# Patient Record
Sex: MALE | Race: BLACK OR AFRICAN AMERICAN | NOT HISPANIC OR LATINO | Employment: FULL TIME | URBAN - METROPOLITAN AREA
[De-identification: names, ages, dates, MRNs, and addresses within clinical notes are randomized per-mention and may not be internally consistent; named-entity substitution may affect disease eponyms.]

---

## 2019-11-01 ENCOUNTER — HOSPITAL ENCOUNTER (EMERGENCY)
Facility: CLINIC | Age: 34
Discharge: HOME | End: 2019-11-01
Attending: EMERGENCY MEDICINE | Admitting: EMERGENCY MEDICINE
Payer: COMMERCIAL

## 2019-11-01 VITALS
BODY MASS INDEX: 30.24 KG/M2 | WEIGHT: 228.18 LBS | RESPIRATION RATE: 16 BRPM | HEART RATE: 74 BPM | SYSTOLIC BLOOD PRESSURE: 121 MMHG | HEIGHT: 73 IN | OXYGEN SATURATION: 98 % | DIASTOLIC BLOOD PRESSURE: 79 MMHG | TEMPERATURE: 98.3 F

## 2019-11-01 DIAGNOSIS — Z11.3 SCREEN FOR STD (SEXUALLY TRANSMITTED DISEASE): ICD-10-CM

## 2019-11-01 LAB
ALBUMIN UR-MCNC: 10 MG/DL
APPEARANCE UR: CLEAR
BILIRUB UR QL STRIP: NEGATIVE
COLOR UR AUTO: YELLOW
GLUCOSE UR STRIP-MCNC: NEGATIVE MG/DL
HGB UR QL STRIP: NEGATIVE
KETONES UR STRIP-MCNC: 10 MG/DL
LEUKOCYTE ESTERASE UR QL STRIP: ABNORMAL
MUCOUS THREADS #/AREA URNS LPF: PRESENT /LPF
NITRATE UR QL: NEGATIVE
PH UR STRIP: 6 PH (ref 5–7)
RBC #/AREA URNS AUTO: 1 /HPF (ref 0–2)
SOURCE: ABNORMAL
SP GR UR STRIP: 1.02 (ref 1–1.03)
SQUAMOUS #/AREA URNS AUTO: 2 /HPF (ref 0–1)
UROBILINOGEN UR STRIP-MCNC: 2 MG/DL (ref 0–2)
WBC #/AREA URNS AUTO: 9 /HPF (ref 0–5)

## 2019-11-01 PROCEDURE — 25000128 H RX IP 250 OP 636: Performed by: EMERGENCY MEDICINE

## 2019-11-01 PROCEDURE — 87591 N.GONORRHOEAE DNA AMP PROB: CPT | Performed by: EMERGENCY MEDICINE

## 2019-11-01 PROCEDURE — 25000132 ZZH RX MED GY IP 250 OP 250 PS 637: Performed by: EMERGENCY MEDICINE

## 2019-11-01 PROCEDURE — 81001 URINALYSIS AUTO W/SCOPE: CPT | Performed by: EMERGENCY MEDICINE

## 2019-11-01 PROCEDURE — 87491 CHLMYD TRACH DNA AMP PROBE: CPT | Performed by: EMERGENCY MEDICINE

## 2019-11-01 PROCEDURE — 25000125 ZZHC RX 250: Performed by: EMERGENCY MEDICINE

## 2019-11-01 PROCEDURE — 96372 THER/PROPH/DIAG INJ SC/IM: CPT

## 2019-11-01 PROCEDURE — 99284 EMERGENCY DEPT VISIT MOD MDM: CPT | Mod: 25

## 2019-11-01 RX ORDER — ONDANSETRON 4 MG/1
4 TABLET, ORALLY DISINTEGRATING ORAL ONCE
Status: COMPLETED | OUTPATIENT
Start: 2019-11-01 | End: 2019-11-01

## 2019-11-01 RX ORDER — ONDANSETRON 4 MG/1
4 TABLET, ORALLY DISINTEGRATING ORAL EVERY 8 HOURS PRN
Qty: 10 TABLET | Refills: 0 | Status: SHIPPED | OUTPATIENT
Start: 2019-11-01 | End: 2019-11-04

## 2019-11-01 RX ORDER — AZITHROMYCIN 250 MG/1
1000 TABLET, FILM COATED ORAL ONCE
Status: COMPLETED | OUTPATIENT
Start: 2019-11-01 | End: 2019-11-01

## 2019-11-01 RX ORDER — METRONIDAZOLE 500 MG/1
2000 TABLET ORAL ONCE
Qty: 4 TABLET | Refills: 0 | Status: SHIPPED | OUTPATIENT
Start: 2019-11-02 | End: 2019-11-02

## 2019-11-01 RX ORDER — METRONIDAZOLE 500 MG/1
2000 TABLET ORAL ONCE
Status: DISCONTINUED | OUTPATIENT
Start: 2019-11-02 | End: 2019-11-01

## 2019-11-01 RX ADMIN — AZITHROMYCIN MONOHYDRATE 1000 MG: 250 TABLET ORAL at 12:04

## 2019-11-01 RX ADMIN — LIDOCAINE HYDROCHLORIDE 250 MG: 10 INJECTION, SOLUTION EPIDURAL; INFILTRATION; INTRACAUDAL; PERINEURAL at 12:38

## 2019-11-01 RX ADMIN — ONDANSETRON 4 MG: 4 TABLET, ORALLY DISINTEGRATING ORAL at 13:31

## 2019-11-01 ASSESSMENT — ENCOUNTER SYMPTOMS
WOUND: 0
HEMATURIA: 1
BACK PAIN: 0
ABDOMINAL PAIN: 1
DYSURIA: 0
FEVER: 0
VOMITING: 0

## 2019-11-01 ASSESSMENT — MIFFLIN-ST. JEOR: SCORE: 2033.88

## 2019-11-01 NOTE — ED TRIAGE NOTES
"Pt c/o low pelvic/abdominal pain for 2 weeks. States \"I think its an STD\" Reports yellow discharge from penis for a week. Denies fever.   "

## 2019-11-01 NOTE — ED AVS SNAPSHOT
[facility] Emergency Department  [address]  [facility] [address]  Phone:  [phone]  Fax:  [phone]                                    Salvador Dorsey   MRN: [id number]    Department:  [facility] Emergency Department   Date of Visit:  11/1/2019           After Visit Summary Signature Page    I have received my discharge instructions, and my questions have been answered. I have discussed any challenges I see with this plan with the nurse or doctor.    ..........................................................................................................................................  Patient/Patient Representative Signature      ..........................................................................................................................................  Patient Representative Print Name and Relationship to Patient    ..................................................               ................................................  Date                                   Time    ..........................................................................................................................................  Reviewed by Signature/Title    ...................................................              ..............................................  Date                                               Time          22EPIC Rev 08/18

## 2019-11-01 NOTE — ED PROVIDER NOTES
"  History     Chief Complaint:  Penile discharge      HPI   Salvador Dorsey is a 33 year old male who presents with penile discharge. He reports that he developed sharp abdominal pain 2 weeks ago. He report that he has been having white penile discharge, possible hematuria for the last week. Had testicular pain one week ago that resolved. No swelling on groin. No rash. He reports that a sexual partner informed him that she had an STD. Has gonorrhea, chlamydia and trichomonas. He denies fever, dysuria, vomiting, rash, groin swelling, back pain, or sores on his groin. He denies a history of kidney stones.    Allergies:  Advil     Medications:    The patient is not currently taking any prescribed medications.    Past Medical History:    The patient denies any significant past medical history.    Past Surgical History:    The patient does not have any pertinent past surgical history.    Family History:    No past pertinent family history.    Social History:  Patient presents to the emergency department alone.  Marital Status:  Single     Review of Systems   Constitutional: Negative for fever.   Gastrointestinal: Positive for abdominal pain. Negative for vomiting.   Genitourinary: Positive for discharge, hematuria and testicular pain. Negative for dysuria, penile swelling and scrotal swelling.   Musculoskeletal: Negative for back pain.   Skin: Negative for rash and wound.   All other systems reviewed and are negative.    Physical Exam     Patient Vitals for the past 24 hrs:   BP Temp Temp src Pulse Heart Rate Resp SpO2 Height Weight   11/01/19 1406 121/79 -- -- 74 -- 16 98 % -- --   11/01/19 1123 130/79 98.3  F (36.8  C) Oral 101 -- 18 99 % 1.854 m (6' 1\") 103.5 kg (228 lb 2.8 oz)       Physical Exam  General: Resting comfortably on the gurney  Eyes:  The pupils are equal and round    Conjunctivae and sclerae are normal  ENT:    Moist mucous membranes  Neck:  Normal range of motion  CV:  Regular rate and rhythm    Skin " warm and well perfused   Resp:  Lungs are clear    Non-labored    No rales    No wheezing   GI:  Abdomen is soft, there is no rigidity    No distension    No rebound tenderness     No abdominal tenderness  MS:  Normal muscular tone  Skin:  No rash or acute skin lesions noted  Neuro:   Awake, alert.      Speech is normal and fluent.    Face is symmetric.     Moves all extremities equally  Psych: Normal affect.  Appropriate interactions.    Emergency Department Course     Laboratory:  UA with Microscopic: ketones: 10, albumin: 10, leukocytosis: small, WBC: 9 (H), squamous epithelial: 2 (H), mucous present, o/w WNL    Gonorrheae PCR: in process    Chlamydia PCR: in process    Interventions:  1238: Rocephin 250 mg in lidocaine 1% IM  1204: Zithromax 1,000 mg PO  1331: Zofran 4 mg PO    Emergency Department Course:  Nursing notes and vitals reviewed. (1141) I performed an exam of the patient as documented above.     IV inserted. Medicine administered as documented above. Urine collected. This was sent to the lab for further testing, results above.    1323 I rechecked the patient and discussed the results of his workup thus far.     Findings and plan explained to the Patient. Patient discharged home with instructions regarding supportive care, medications, and reasons to return. The importance of close follow-up was reviewed. The patient was prescribed Zofran    I personally reviewed the laboratory results with the Patient and answered all related questions prior to discharge.    Impression & Plan    Medical Decision Making:  Salvador Dorsey is a 33 year old male who presented with concern for sexually transmitted infection.  The patient came in for evaluation because of penile discharge.   A urine sample was obtained and sent for PCR.  The patient was treated empirically with Rocephin and Azithromycin. Will also do flagyl 2 g tomorrow. Used alcohol in last 48 hours.  Additionally, the patient was informed that they need  to abstain from sexual activity for 2 weeks.  The patient was also that swabs will come back with results at a later date and they will be contacted only if positive. No blood in urine, doubt stone. No abdominal tenderness on exam to suggest appendicitis, stone or other intra-abdominal infection/process.    Diagnosis:    ICD-10-CM    1. Screen for STD (sexually transmitted disease) Z11.3        Disposition:  discharged to home with Zofran    Discharge Medications:  Discharge Medication List as of 11/1/2019  1:58 PM      START taking these medications    Details   ondansetron (ZOFRAN ODT) 4 MG ODT tab Take 1 tablet (4 mg) by mouth every 8 hours as needed, Disp-10 tablet, R-0, Local Print             Gulshan Lou  11/1/2019   [facility] EMERGENCY DEPARTMENT  Scribe Disclosure:  I, Gulshan Lou, am serving as a scribe on 11/1/2019 at 11:41 AM to personally document services performed by Tangela Alba MD based on my observations and the provider's statements to me.      Tangela Alba MD  11/01/19 0324

## 2019-11-03 LAB
C TRACH DNA SPEC QL NAA+PROBE: NEGATIVE
N GONORRHOEA DNA SPEC QL NAA+PROBE: NEGATIVE
SPECIMEN SOURCE: NORMAL
SPECIMEN SOURCE: NORMAL